# Patient Record
Sex: MALE | Race: WHITE | NOT HISPANIC OR LATINO | Employment: OTHER | URBAN - METROPOLITAN AREA
[De-identification: names, ages, dates, MRNs, and addresses within clinical notes are randomized per-mention and may not be internally consistent; named-entity substitution may affect disease eponyms.]

---

## 2023-03-31 ENCOUNTER — TELEPHONE (OUTPATIENT)
Dept: PRIMARY CARE | Facility: CLINIC | Age: 83
End: 2023-03-31
Payer: MEDICARE

## 2023-04-03 ENCOUNTER — TELEPHONE (OUTPATIENT)
Dept: PRIMARY CARE | Facility: CLINIC | Age: 83
End: 2023-04-03
Payer: MEDICARE

## 2023-04-03 NOTE — TELEPHONE ENCOUNTER
Patient's daughter would like to find out the results for the EMG from January.   Patient is seeing the ortho  Today at [facility] at [facility]. Is it possible to send the information before his appointment at ?  Patient is seeing Dr. Pritchett at [facility] today at [facility]. [facility] [place]. PH: [phone]/FAX: [phone]

## 2023-04-06 ENCOUNTER — TELEPHONE (OUTPATIENT)
Dept: PRIMARY CARE | Facility: CLINIC | Age: 83
End: 2023-04-06
Payer: MEDICARE

## 2023-04-11 ENCOUNTER — TELEPHONE (OUTPATIENT)
Dept: PRIMARY CARE | Facility: CLINIC | Age: 83
End: 2023-04-11
Payer: MEDICARE

## 2023-04-11 NOTE — TELEPHONE ENCOUNTER
Patient's daughter, Corina. Patient is following up with pain management for an epidural block of his spine. Patient was admitted at [facility] last year, and the house  Prescribed Cumadin. Patient follows up with the Cumadin clinic weekly, and patient is still continuing to take Cumadin. Patient was advised by pain management, that he needs to go off of Cumadin a few days prior to the epidural block. Patient's daughter has tried to reach the original house  At [facility], and they won't discuss discontinuing the medication, as patient needs to speak to Cardiologist, patient's daughter spoke to Cardiologist, and they said they won't advise on discontinuing use either. Per Dr. Mora, the Cardiologist at the Clinic needs to follow this, as this is a drug that Cardiologist would prescribe. It was suggested that  Follow up with the [facility] Cardiologist, per Dr. Mora, or Dr. Mora can recommend a  Cardiologist.

## 2023-05-08 PROBLEM — H10.9 CONJUNCTIVITIS: Status: ACTIVE | Noted: 2023-05-08

## 2023-05-08 PROBLEM — M47.816 LUMBAR SPONDYLOSIS: Status: ACTIVE | Noted: 2023-05-08

## 2023-05-08 PROBLEM — M16.9 DEGENERATIVE JOINT DISEASE (DJD) OF HIP: Status: ACTIVE | Noted: 2023-05-08

## 2023-05-08 PROBLEM — J30.2 SEASONAL ALLERGIES: Status: ACTIVE | Noted: 2023-05-08

## 2023-05-08 PROBLEM — H02.135 SENILE ECTROPION OF BOTH LOWER EYELIDS: Status: ACTIVE | Noted: 2023-05-08

## 2023-05-08 PROBLEM — H02.88A MEIBOMIAN GLAND DYSFUNCTION (MGD), BILATERAL, BOTH UPPER AND LOWER LIDS: Status: ACTIVE | Noted: 2023-05-08

## 2023-05-08 PROBLEM — U07.1 COVID-19: Status: ACTIVE | Noted: 2023-05-08

## 2023-05-08 PROBLEM — J30.9 ALLERGIC RHINITIS: Status: ACTIVE | Noted: 2023-05-08

## 2023-05-08 PROBLEM — K27.5 PERFORATED ULCER (MULTI): Status: ACTIVE | Noted: 2023-05-08

## 2023-05-08 PROBLEM — R00.2 HEART PALPITATIONS: Status: ACTIVE | Noted: 2023-05-08

## 2023-05-08 PROBLEM — H02.132 SENILE ECTROPION OF BOTH LOWER EYELIDS: Status: ACTIVE | Noted: 2023-05-08

## 2023-05-08 PROBLEM — H02.109 ECTROPION: Status: ACTIVE | Noted: 2023-05-08

## 2023-05-08 PROBLEM — E11.9 DIABETES MELLITUS TYPE 2 WITHOUT RETINOPATHY (MULTI): Status: ACTIVE | Noted: 2023-05-08

## 2023-05-08 PROBLEM — H60.90 OTITIS EXTERNA: Status: ACTIVE | Noted: 2023-05-08

## 2023-05-08 PROBLEM — N28.1 CYST, KIDNEY, ACQUIRED: Status: ACTIVE | Noted: 2023-05-08

## 2023-05-08 PROBLEM — I50.9 CONGESTIVE HEART FAILURE (MULTI): Status: ACTIVE | Noted: 2023-05-08

## 2023-05-08 PROBLEM — R73.9 HYPERGLYCEMIA: Status: ACTIVE | Noted: 2023-05-08

## 2023-05-08 PROBLEM — I10 BENIGN ESSENTIAL HYPERTENSION: Status: ACTIVE | Noted: 2023-05-08

## 2023-05-08 PROBLEM — N18.30 CHRONIC KIDNEY DISEASE, STAGE 3 (MULTI): Status: ACTIVE | Noted: 2023-05-08

## 2023-05-08 PROBLEM — R19.7 DIARRHEA: Status: ACTIVE | Noted: 2023-05-08

## 2023-05-08 PROBLEM — Z96.1 PSEUDOPHAKIA OF LEFT EYE: Status: ACTIVE | Noted: 2023-05-08

## 2023-05-08 PROBLEM — K21.9 GERD WITHOUT ESOPHAGITIS: Status: ACTIVE | Noted: 2023-05-08

## 2023-05-08 PROBLEM — H02.109 PUNCTAL ECTROPION: Status: ACTIVE | Noted: 2023-05-08

## 2023-05-08 PROBLEM — H02.88B MEIBOMIAN GLAND DYSFUNCTION (MGD), BILATERAL, BOTH UPPER AND LOWER LIDS: Status: ACTIVE | Noted: 2023-05-08

## 2023-05-08 PROBLEM — G54.2 CERVICAL NERVE ROOT IMPINGEMENT: Status: ACTIVE | Noted: 2023-05-08

## 2023-05-08 PROBLEM — D50.9 IRON DEFICIENCY ANEMIA: Status: ACTIVE | Noted: 2023-05-08

## 2023-05-08 PROBLEM — I80.209 THROMBOPHLEBITIS OF DEEP VEINS OF LOWER EXTREMITY (MULTI): Status: ACTIVE | Noted: 2023-05-08

## 2023-05-08 PROBLEM — H26.492 POSTERIOR CAPSULAR OPACIFICATION NON VISUALLY SIGNIFICANT OF LEFT EYE: Status: ACTIVE | Noted: 2023-05-08

## 2023-05-08 PROBLEM — R06.09 DYSPNEA ON MINIMAL EXERTION: Status: ACTIVE | Noted: 2023-05-08

## 2023-05-08 PROBLEM — C44.92 SQUAMOUS CELL CARCINOMA OF SKIN: Status: ACTIVE | Noted: 2023-05-08

## 2023-05-08 PROBLEM — I80.00 THROMBOPHLEBITIS OF LEG, SUPERFICIAL: Status: ACTIVE | Noted: 2023-05-08

## 2023-05-08 PROBLEM — M54.16 LUMBAR RADICULOPATHY: Status: ACTIVE | Noted: 2023-05-08

## 2023-05-08 PROBLEM — H66.90 OTITIS MEDIA: Status: ACTIVE | Noted: 2023-05-08

## 2023-05-08 PROBLEM — I73.9 PVD (PERIPHERAL VASCULAR DISEASE) (CMS-HCC): Status: ACTIVE | Noted: 2023-05-08

## 2023-05-08 PROBLEM — M79.2 NERVE PAIN: Status: ACTIVE | Noted: 2023-05-08

## 2023-05-08 PROBLEM — M54.9 BACK PAIN, CHRONIC: Status: ACTIVE | Noted: 2023-05-08

## 2023-05-08 PROBLEM — G89.29 BACK PAIN, CHRONIC: Status: ACTIVE | Noted: 2023-05-08

## 2023-05-08 PROBLEM — H57.89 EYE DISCHARGE: Status: ACTIVE | Noted: 2023-05-08

## 2023-05-08 PROBLEM — R19.7 ACUTE DIARRHEA: Status: ACTIVE | Noted: 2023-05-08

## 2023-05-08 PROBLEM — E78.5 HYPERLIPIDEMIA: Status: ACTIVE | Noted: 2023-05-08

## 2023-05-08 PROBLEM — I31.39: Status: ACTIVE | Noted: 2023-05-08

## 2023-05-08 PROBLEM — I48.91 ATRIAL FIBRILLATION (MULTI): Status: ACTIVE | Noted: 2023-05-08

## 2023-05-08 PROBLEM — Z93.4 JEJUNOSTOMY TUBE IN SITU (MULTI): Status: ACTIVE | Noted: 2023-05-08

## 2023-05-08 PROBLEM — I35.0 MILD CALCIFIC AORTIC STENOSIS: Status: ACTIVE | Noted: 2023-05-08

## 2023-05-08 PROBLEM — H90.3 SENSORINEURAL HEARING LOSS OF BOTH EARS: Status: ACTIVE | Noted: 2023-05-08

## 2023-05-08 PROBLEM — M48.061 LUMBAR CANAL STENOSIS: Status: ACTIVE | Noted: 2023-05-08

## 2023-05-08 PROBLEM — Z96.1 PSEUDOPHAKIA OF RIGHT EYE: Status: ACTIVE | Noted: 2023-05-08

## 2023-05-08 PROBLEM — D63.8 ANEMIA OF CHRONIC DISEASE: Status: ACTIVE | Noted: 2023-05-08

## 2023-05-10 ENCOUNTER — OFFICE VISIT (OUTPATIENT)
Dept: PRIMARY CARE | Facility: CLINIC | Age: 83
End: 2023-05-10
Payer: MEDICARE

## 2023-05-10 VITALS
BODY MASS INDEX: 26.08 KG/M2 | DIASTOLIC BLOOD PRESSURE: 70 MMHG | TEMPERATURE: 97.9 F | WEIGHT: 187 LBS | SYSTOLIC BLOOD PRESSURE: 120 MMHG | HEART RATE: 59 BPM | RESPIRATION RATE: 18 BRPM | OXYGEN SATURATION: 94 %

## 2023-05-10 DIAGNOSIS — E11.9 DIABETES MELLITUS TYPE 2 WITHOUT RETINOPATHY (MULTI): ICD-10-CM

## 2023-05-10 DIAGNOSIS — I48.0 PAROXYSMAL ATRIAL FIBRILLATION (MULTI): ICD-10-CM

## 2023-05-10 DIAGNOSIS — D68.9 COAGULATION DEFECT, UNSPECIFIED (MULTI): Primary | ICD-10-CM

## 2023-05-10 DIAGNOSIS — I73.9 PVD (PERIPHERAL VASCULAR DISEASE) (CMS-HCC): ICD-10-CM

## 2023-05-10 PROBLEM — Z93.4 JEJUNOSTOMY TUBE IN SITU (MULTI): Status: RESOLVED | Noted: 2023-05-08 | Resolved: 2023-05-10

## 2023-05-10 PROCEDURE — 3078F DIAST BP <80 MM HG: CPT | Performed by: INTERNAL MEDICINE

## 2023-05-10 PROCEDURE — 3074F SYST BP LT 130 MM HG: CPT | Performed by: INTERNAL MEDICINE

## 2023-05-10 PROCEDURE — 1036F TOBACCO NON-USER: CPT | Performed by: INTERNAL MEDICINE

## 2023-05-10 PROCEDURE — 1159F MED LIST DOCD IN RCRD: CPT | Performed by: INTERNAL MEDICINE

## 2023-05-10 PROCEDURE — 99214 OFFICE O/P EST MOD 30 MIN: CPT | Performed by: INTERNAL MEDICINE

## 2023-05-10 RX ORDER — CALCITRIOL 0.25 UG/1
0.25 CAPSULE ORAL DAILY
COMMUNITY
Start: 2022-05-16

## 2023-05-10 RX ORDER — FLUTICASONE PROPIONATE 50 MCG
1 SPRAY, SUSPENSION (ML) NASAL
COMMUNITY
Start: 2019-11-16 | End: 2023-08-30 | Stop reason: ALTCHOICE

## 2023-05-10 RX ORDER — DOCUSATE SODIUM 100 MG/1
100 CAPSULE, LIQUID FILLED ORAL 2 TIMES DAILY
COMMUNITY

## 2023-05-10 RX ORDER — FUROSEMIDE 80 MG/1
1 TABLET ORAL DAILY
COMMUNITY

## 2023-05-10 RX ORDER — NIFEDIPINE 60 MG/1
60 TABLET, FILM COATED, EXTENDED RELEASE ORAL
COMMUNITY

## 2023-05-10 RX ORDER — WARFARIN SODIUM 5 MG/1
5 TABLET ORAL
COMMUNITY
Start: 2021-07-27

## 2023-05-10 RX ORDER — PANTOPRAZOLE SODIUM 40 MG/1
1 TABLET, DELAYED RELEASE ORAL
COMMUNITY
Start: 2021-10-25

## 2023-05-10 ASSESSMENT — PATIENT HEALTH QUESTIONNAIRE - PHQ9
2. FEELING DOWN, DEPRESSED OR HOPELESS: NOT AT ALL
1. LITTLE INTEREST OR PLEASURE IN DOING THINGS: NOT AT ALL
SUM OF ALL RESPONSES TO PHQ9 QUESTIONS 1 AND 2: 0

## 2023-05-10 NOTE — PROGRESS NOTES
"Patient here for a 6 month follow up    Subjective   Patient ID: Rene Tilley is a 82 y.o. male who presents for Follow-up.    The patient reports left wrist pain and swelling after a mild injury around 4/30/2023. He denies any fever or chills, and recalls one episode of gout in the past. The symptoms are tolerable thus far, and he does not believe that an X-ray is necessary as yet. The patient has established care with  from Cardiology at the [facility]. He notes that his condition is stable, and will be following up annually. 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 He met with  from Pain Medicine and received a Botox injection which was minimally beneficial. He was referred to  from Pain Medicine at the [facility] and received corticosteroid injection which was significantly helpful. He notes an improvement in symptoms of \"60-70%.\" The patient continues to follow with the Coumadin Clinic at [facility] and reports that his condition is stable. The patient attends dialysis on Mondays and Fridays and finds that this is working much better for his schedule. He is also maintained on furosemide 80mg, but has not been taking the medication on the days of dialysis per clinical advice.        Review of Systems   All other systems reviewed and are negative.      Objective   Physical Exam  Constitutional:       Appearance: Normal appearance.   Cardiovascular:      Rate and Rhythm: Normal rate and regular rhythm.      Heart sounds: Normal heart sounds.   Pulmonary:      Effort: Pulmonary effort is normal.      Breath sounds: Normal breath sounds.   Abdominal:      General: Bowel sounds are normal.      Palpations: Abdomen is soft.      Tenderness: There is no abdominal tenderness.   Skin:     General: Skin is warm and dry.   Neurological:      General: No focal deficit present.      Mental Status: He is alert and oriented to person, place, " and time. Mental status is at baseline.   Psychiatric:         Mood and Affect: Mood normal.         Behavior: Behavior normal.       Assessment/Plan   Problem List Items Addressed This Visit       Atrial fibrillation (CMS/HCC)    Relevant Medications    NIFEdipine CC 60 mg 24 hr tablet    Diabetes mellitus type 2 without retinopathy (CMS/HCC)    PVD (peripheral vascular disease) (CMS/HCC)    Coagulation defect, unspecified (CMS/HCC) - Primary    Relevant Medications    warfarin (Coumadin) 5 mg tablet       IMPRESSIONS/PLAN:     CHF   - Echo 12/2022 showed normal LVSF with EF 60%.  RA and LA dilated, with moderate LV concentric hypertrophy.  Moderate pericardial effusion with no cardiac tamponade.  Nuclear Stress Test 2/2023 reassuring with low likelihood of flow-limiting CAD.  Previously on furosemide 80 mg once daily w/ K+ supplement. He also has dialysis on T/T/Sa. Following with Cardiology from [facility].     HTN   - /70 today in office, continue on nifedipine ER 60mg QAM before breakfast.     Reflux   - Upper endoscopy 7/2019. Symptoms managed with pantoprazole 40mg QD.     CKD   - On dialysis M/F.  Takes 80 mg of Lasix on non-dialysis days.       Thrombophlebitis of Lower Extremity / A-fib  - Maintained on warfarin 5mg BID. Following w/ the Coumadin Clinic at [place].     Health Maintenance   - Routine labs 2/2023 per [place] [facility]. Last colonoscopy performed 4/2013, due for repeat 2023.     Follow up in 6 months, call sooner if needed.        Scribe Attestation  By signing my name below, ILucía Scribe   attest that this documentation has been prepared under the direction and in the presence of Hector Mora DO.

## 2023-06-07 ENCOUNTER — OFFICE VISIT (OUTPATIENT)
Dept: PRIMARY CARE | Facility: CLINIC | Age: 83
End: 2023-06-07
Payer: MEDICARE

## 2023-06-07 VITALS
TEMPERATURE: 97.7 F | BODY MASS INDEX: 26.46 KG/M2 | HEIGHT: 71 IN | SYSTOLIC BLOOD PRESSURE: 134 MMHG | HEART RATE: 54 BPM | DIASTOLIC BLOOD PRESSURE: 66 MMHG | WEIGHT: 189 LBS | OXYGEN SATURATION: 95 %

## 2023-06-07 DIAGNOSIS — R19.7 ACUTE DIARRHEA: Primary | ICD-10-CM

## 2023-06-07 PROBLEM — E16.2 HYPOGLYCEMIA, UNSPECIFIED: Status: ACTIVE | Noted: 2021-07-26

## 2023-06-07 PROBLEM — R50.9 FEVER, UNSPECIFIED: Status: ACTIVE | Noted: 2021-07-26

## 2023-06-07 PROBLEM — D68.9 COAGULATION DEFECT, UNSPECIFIED (MULTI): Status: ACTIVE | Noted: 2021-07-26

## 2023-06-07 PROBLEM — E29.1 TESTICULAR HYPOFUNCTION: Status: ACTIVE | Noted: 2023-04-28

## 2023-06-07 PROBLEM — Z98.49 CATARACT EXTRACTION STATUS, UNSPECIFIED EYE: Status: ACTIVE | Noted: 2021-07-26

## 2023-06-07 PROBLEM — T78.2XXA ANAPHYLACTIC SHOCK, UNSPECIFIED, INITIAL ENCOUNTER: Status: ACTIVE | Noted: 2022-05-03

## 2023-06-07 PROBLEM — R51.9 HEADACHE, UNSPECIFIED: Status: ACTIVE | Noted: 2021-07-26

## 2023-06-07 PROBLEM — R06.02 SHORTNESS OF BREATH: Status: ACTIVE | Noted: 2021-07-26

## 2023-06-07 PROBLEM — R10.9 ABDOMINAL PAIN: Status: ACTIVE | Noted: 2022-11-21

## 2023-06-07 PROBLEM — R22.9 LOCALIZED SUPERFICIAL SWELLING OF SKIN: Status: ACTIVE | Noted: 2023-04-28

## 2023-06-07 PROBLEM — K63.9 DISORDER OF INTESTINE: Status: ACTIVE | Noted: 2023-04-28

## 2023-06-07 PROBLEM — I13.10 HYPERTENSIVE HEART AND KIDNEY DISEASE: Status: ACTIVE | Noted: 2017-08-07

## 2023-06-07 PROBLEM — N25.81 SECONDARY HYPERPARATHYROIDISM OF RENAL ORIGIN (MULTI): Status: ACTIVE | Noted: 2021-10-11

## 2023-06-07 PROBLEM — I50.43 ACUTE ON CHRONIC COMBINED SYSTOLIC AND DIASTOLIC CHF (CONGESTIVE HEART FAILURE) (MULTI): Status: ACTIVE | Noted: 2022-11-21

## 2023-06-07 PROBLEM — E55.9 VITAMIN D DEFICIENCY, UNSPECIFIED: Status: ACTIVE | Noted: 2021-10-21

## 2023-06-07 PROBLEM — H91.90 HEARING LOSS: Status: ACTIVE | Noted: 2023-04-28

## 2023-06-07 PROBLEM — R80.9 PROTEINURIA: Status: ACTIVE | Noted: 2023-04-28

## 2023-06-07 PROBLEM — E88.810 METABOLIC SYNDROME X: Status: ACTIVE | Noted: 2023-04-28

## 2023-06-07 PROBLEM — I27.20 PULMONARY HTN (MULTI): Status: ACTIVE | Noted: 2022-02-01

## 2023-06-07 PROBLEM — I50.9 ACUTE ON CHRONIC CONGESTIVE HEART FAILURE (MULTI): Status: ACTIVE | Noted: 2022-11-21

## 2023-06-07 PROBLEM — I48.91 ATRIAL FIBRILLATION (MULTI): Status: ACTIVE | Noted: 2021-10-01

## 2023-06-07 PROBLEM — I10 HYPERTENSION: Status: ACTIVE | Noted: 2021-07-05

## 2023-06-07 PROBLEM — I50.32 CHRONIC DIASTOLIC CONGESTIVE HEART FAILURE (MULTI): Chronic | Status: ACTIVE | Noted: 2021-07-05

## 2023-06-07 PROBLEM — I82.409 DVT (DEEP VENOUS THROMBOSIS) (MULTI): Status: ACTIVE | Noted: 2023-04-28

## 2023-06-07 PROBLEM — K21.9 GASTROESOPHAGEAL REFLUX DISEASE: Status: ACTIVE | Noted: 2023-04-28

## 2023-06-07 PROBLEM — M10.9 GOUT: Status: ACTIVE | Noted: 2023-04-28

## 2023-06-07 PROBLEM — Z86.718 PERSONAL HISTORY OF DVT (DEEP VEIN THROMBOSIS): Status: ACTIVE | Noted: 2017-01-06

## 2023-06-07 PROBLEM — N18.6 ESRD ON DIALYSIS (MULTI): Status: ACTIVE | Noted: 2021-10-01

## 2023-06-07 PROBLEM — K92.2 GASTROINTESTINAL HEMORRHAGE: Status: ACTIVE | Noted: 2023-04-28

## 2023-06-07 PROBLEM — I50.82 BIVENTRICULAR HEART FAILURE (MULTI): Status: ACTIVE | Noted: 2021-07-15

## 2023-06-07 PROBLEM — M12.9 ARTHROPATHY: Status: ACTIVE | Noted: 2023-04-28

## 2023-06-07 PROBLEM — N18.5 CKD (CHRONIC KIDNEY DISEASE), STAGE V (MULTI): Status: ACTIVE | Noted: 2017-08-07

## 2023-06-07 PROBLEM — Z99.2 ESRD ON DIALYSIS (MULTI): Status: ACTIVE | Noted: 2021-10-01

## 2023-06-07 PROCEDURE — 1160F RVW MEDS BY RX/DR IN RCRD: CPT | Performed by: STUDENT IN AN ORGANIZED HEALTH CARE EDUCATION/TRAINING PROGRAM

## 2023-06-07 PROCEDURE — 1036F TOBACCO NON-USER: CPT | Performed by: STUDENT IN AN ORGANIZED HEALTH CARE EDUCATION/TRAINING PROGRAM

## 2023-06-07 PROCEDURE — 1159F MED LIST DOCD IN RCRD: CPT | Performed by: STUDENT IN AN ORGANIZED HEALTH CARE EDUCATION/TRAINING PROGRAM

## 2023-06-07 PROCEDURE — 3078F DIAST BP <80 MM HG: CPT | Performed by: STUDENT IN AN ORGANIZED HEALTH CARE EDUCATION/TRAINING PROGRAM

## 2023-06-07 PROCEDURE — 3075F SYST BP GE 130 - 139MM HG: CPT | Performed by: STUDENT IN AN ORGANIZED HEALTH CARE EDUCATION/TRAINING PROGRAM

## 2023-06-07 PROCEDURE — 99213 OFFICE O/P EST LOW 20 MIN: CPT | Performed by: STUDENT IN AN ORGANIZED HEALTH CARE EDUCATION/TRAINING PROGRAM

## 2023-06-07 RX ORDER — ATORVASTATIN CALCIUM 20 MG/1
20 TABLET, FILM COATED ORAL
COMMUNITY
Start: 2023-05-31 | End: 2023-08-30 | Stop reason: ALTCHOICE

## 2023-06-07 RX ORDER — LISINOPRIL 5 MG/1
5 TABLET ORAL
COMMUNITY
End: 2023-08-30 | Stop reason: ALTCHOICE

## 2023-06-07 RX ORDER — OMEPRAZOLE 40 MG/1
40 CAPSULE, DELAYED RELEASE ORAL
COMMUNITY
End: 2023-08-30 | Stop reason: ALTCHOICE

## 2023-06-07 RX ORDER — LOSARTAN POTASSIUM 50 MG/1
50 TABLET ORAL
COMMUNITY
End: 2023-08-30 | Stop reason: ALTCHOICE

## 2023-06-07 RX ORDER — CALCIUM ACETATE 667 MG/1
667 CAPSULE ORAL
COMMUNITY
Start: 2022-12-12 | End: 2023-08-30 | Stop reason: ALTCHOICE

## 2023-06-07 RX ORDER — NAPROXEN 500 MG/1
TABLET ORAL
COMMUNITY
End: 2023-08-30 | Stop reason: ALTCHOICE

## 2023-06-07 ASSESSMENT — ENCOUNTER SYMPTOMS
SHORTNESS OF BREATH: 0
ABDOMINAL PAIN: 0
BLOOD IN STOOL: 0
ABDOMINAL DISTENTION: 1
DIARRHEA: 1
NAUSEA: 0
FEVER: 0
DYSURIA: 0
LIGHT-HEADEDNESS: 0
DIZZINESS: 0
CHILLS: 0
VOMITING: 0
DIAPHORESIS: 0

## 2023-06-07 ASSESSMENT — PATIENT HEALTH QUESTIONNAIRE - PHQ9
SUM OF ALL RESPONSES TO PHQ9 QUESTIONS 1 AND 2: 0
2. FEELING DOWN, DEPRESSED OR HOPELESS: NOT AT ALL
1. LITTLE INTEREST OR PLEASURE IN DOING THINGS: NOT AT ALL

## 2023-06-07 NOTE — PROGRESS NOTES
"Subjective   Patient ID: Rene Tilley is a 82 y.o. male who presents for GI Problem.  He is here for diarrhea. He's has 2 days of diarrhea and feels he can't hold his stool. No blood in stool. No recent antibiotics. No change in recent medications. This happened a few months ago and resolved spontaneously. No saddle paresthesia. Has diarrhea once a day and it is a large amount of stool. No abdominal pain. No recent travel. No sick contacts.         Review of Systems   Constitutional:  Negative for chills, diaphoresis and fever.   HENT:  Positive for hearing loss (chronic, unchanged).    Eyes:  Negative for visual disturbance.   Respiratory:  Negative for shortness of breath.    Cardiovascular:  Negative for chest pain.   Gastrointestinal:  Positive for abdominal distention and diarrhea. Negative for abdominal pain, blood in stool, nausea and vomiting.   Genitourinary:  Negative for dysuria.   Skin:  Negative for rash.   Neurological:  Negative for dizziness and light-headedness.       /66   Pulse 54   Temp 36.5 °C (97.7 °F)   Ht 1.803 m (5' 11\")   Wt 85.7 kg (189 lb)   SpO2 95%   BMI 26.36 kg/m²     Objective   Physical Exam  Vitals reviewed.   Constitutional:       General: He is not in acute distress.     Appearance: Normal appearance.      Comments: Offered chaperone and he politely declined.   HENT:      Head: Normocephalic.   Cardiovascular:      Rate and Rhythm: Normal rate and regular rhythm.   Pulmonary:      Effort: Pulmonary effort is normal. No respiratory distress.      Breath sounds: Normal breath sounds.   Abdominal:      General: Bowel sounds are normal. There is no distension.      Palpations: Abdomen is soft. There is no mass.      Tenderness: There is no abdominal tenderness. There is no guarding or rebound.      Hernia: A hernia (Large ventral hernia present, reducible, no tenderness.) is present.   Genitourinary:     Rectum: Normal.      Comments: Rectal tone normal. Sensation " grossly intact outside rectum.  Musculoskeletal:         General: No deformity.   Skin:     Coloration: Skin is not jaundiced.   Neurological:      Mental Status: He is alert.         Assessment/Plan   Problem List Items Addressed This Visit          Digestive    Acute diarrhea - Primary     Given the significant large amount of diarrhea present ordered stool studies including C. difficile, stool pathogen panel PCR and ova and parasite and Giardia and Cryptosporidium panel.  Advised ER for any lightheadedness, dizziness, concerns of dehydration as he may need IV fluids and antibiotics.  We will hold off on any medications to slow the bowel until stool testing returns.  Follow-up with me as needed.  Follow-up with Dr. Mora as previously scheduled.         Relevant Orders    C. difficile, PCR    Stool Pathogen Panel, PCR    Ova/Para + Giardia/Cryptosporidium Antigen

## 2023-06-08 ENCOUNTER — LAB (OUTPATIENT)
Dept: LAB | Facility: LAB | Age: 83
End: 2023-06-08
Payer: MEDICARE

## 2023-06-08 DIAGNOSIS — R19.7 ACUTE DIARRHEA: ICD-10-CM

## 2023-06-08 PROCEDURE — 87328 CRYPTOSPORIDIUM AG IA: CPT

## 2023-06-08 PROCEDURE — 87329 GIARDIA AG IA: CPT

## 2023-06-08 PROCEDURE — 87177 OVA AND PARASITES SMEARS: CPT

## 2023-06-08 PROCEDURE — 87506 IADNA-DNA/RNA PROBE TQ 6-11: CPT

## 2023-06-08 NOTE — ASSESSMENT & PLAN NOTE
Given the significant large amount of diarrhea present ordered stool studies including C. difficile, stool pathogen panel PCR and ova and parasite and Giardia and Cryptosporidium panel.  Advised ER for any lightheadedness, dizziness, concerns of dehydration as he may need IV fluids and antibiotics.  We will hold off on any medications to slow the bowel until stool testing returns.  Follow-up with me as needed.  Follow-up with Dr. Mora as previously scheduled.

## 2023-06-09 LAB
CAMPYLOBACTER GP: NOT DETECTED
NOROVIRUS GI/GII: NOT DETECTED
ROTAVIRUS A: NOT DETECTED
SALMONELLA SP.: NOT DETECTED
SHIGA TOXIN 1: NOT DETECTED
SHIGA TOXIN 2: NOT DETECTED
SHIGELLA SP.: NOT DETECTED
VIBRIO GRP.: NOT DETECTED
YERSINIA ENTEROCOLITICA: NOT DETECTED

## 2023-06-12 ENCOUNTER — TELEPHONE (OUTPATIENT)
Dept: PRIMARY CARE | Facility: CLINIC | Age: 83
End: 2023-06-12
Payer: MEDICARE

## 2023-06-12 DIAGNOSIS — R19.7 DIARRHEA, UNSPECIFIED TYPE: Primary | ICD-10-CM

## 2023-06-12 NOTE — TELEPHONE ENCOUNTER
"Pt's daughter Mikki Hernandez called. I informed her test results weren't back but they looked negative.  She stated Don \"having horrible bowels that just let loose.\"  Didn't know if he needs to see GI?        "

## 2023-06-13 LAB
CRYPTOSPORIDIUM ANTIGEN-DATA CONVERSION: NEGATIVE
GIARDIA LAMBLIA AG-DATA CONVERSION: NEGATIVE
OVA + PARASITE EXAM: NEGATIVE

## 2023-06-14 ENCOUNTER — TELEPHONE (OUTPATIENT)
Dept: PRIMARY CARE | Facility: CLINIC | Age: 83
End: 2023-06-14
Payer: MEDICARE

## 2023-06-14 DIAGNOSIS — M25.532 WRIST PAIN, ACUTE, LEFT: Primary | ICD-10-CM

## 2023-06-14 NOTE — TELEPHONE ENCOUNTER
----- Message from Syed Sanon DO sent at 6/13/2023  9:47 PM EDT -----  Please let him know stool panel workup has all come back and is negative.

## 2023-06-19 DIAGNOSIS — M25.539 PAIN IN WRIST, UNSPECIFIED LATERALITY: Primary | ICD-10-CM

## 2023-08-30 ENCOUNTER — LAB (OUTPATIENT)
Dept: LAB | Facility: LAB | Age: 83
End: 2023-08-30
Payer: MEDICARE

## 2023-08-30 ENCOUNTER — OFFICE VISIT (OUTPATIENT)
Dept: PRIMARY CARE | Facility: CLINIC | Age: 83
End: 2023-08-30
Payer: MEDICARE

## 2023-08-30 ENCOUNTER — PATIENT MESSAGE (OUTPATIENT)
Dept: PRIMARY CARE | Facility: CLINIC | Age: 83
End: 2023-08-30

## 2023-08-30 VITALS
DIASTOLIC BLOOD PRESSURE: 60 MMHG | TEMPERATURE: 97.6 F | WEIGHT: 177 LBS | BODY MASS INDEX: 24.69 KG/M2 | SYSTOLIC BLOOD PRESSURE: 158 MMHG | HEART RATE: 87 BPM | RESPIRATION RATE: 16 BRPM | OXYGEN SATURATION: 93 %

## 2023-08-30 DIAGNOSIS — M10.9 GOUT, UNSPECIFIED CAUSE, UNSPECIFIED CHRONICITY, UNSPECIFIED SITE: Primary | ICD-10-CM

## 2023-08-30 DIAGNOSIS — I48.0 PAROXYSMAL ATRIAL FIBRILLATION (MULTI): ICD-10-CM

## 2023-08-30 DIAGNOSIS — I48.0 PAROXYSMAL ATRIAL FIBRILLATION (MULTI): Primary | ICD-10-CM

## 2023-08-30 LAB
INR IN PPP BY COAGULATION ASSAY: 6.7 (ref 0.9–1.1)
PROTHROMBIN TIME (PT) IN PPP BY COAGULATION ASSAY: 76.6 SEC (ref 9.8–12.8)

## 2023-08-30 PROCEDURE — 1036F TOBACCO NON-USER: CPT | Performed by: INTERNAL MEDICINE

## 2023-08-30 PROCEDURE — 3078F DIAST BP <80 MM HG: CPT | Performed by: INTERNAL MEDICINE

## 2023-08-30 PROCEDURE — 3077F SYST BP >= 140 MM HG: CPT | Performed by: INTERNAL MEDICINE

## 2023-08-30 PROCEDURE — 36415 COLL VENOUS BLD VENIPUNCTURE: CPT

## 2023-08-30 PROCEDURE — 85610 PROTHROMBIN TIME: CPT

## 2023-08-30 PROCEDURE — 99214 OFFICE O/P EST MOD 30 MIN: CPT | Performed by: INTERNAL MEDICINE

## 2023-08-30 PROCEDURE — 1159F MED LIST DOCD IN RCRD: CPT | Performed by: INTERNAL MEDICINE

## 2023-08-30 PROCEDURE — 1160F RVW MEDS BY RX/DR IN RCRD: CPT | Performed by: INTERNAL MEDICINE

## 2023-08-30 PROCEDURE — 1125F AMNT PAIN NOTED PAIN PRSNT: CPT | Performed by: INTERNAL MEDICINE

## 2023-08-30 RX ORDER — PREDNISONE 5 MG/1
TABLET ORAL
Qty: 30 TABLET | Refills: 0 | Status: SHIPPED | OUTPATIENT
Start: 2023-08-30

## 2023-08-30 RX ORDER — COLCHICINE 0.6 MG/1
TABLET ORAL
Qty: 45 TABLET | Refills: 3 | Status: SHIPPED | OUTPATIENT
Start: 2023-08-30

## 2023-08-30 RX ORDER — OXYCODONE HYDROCHLORIDE 5 MG/1
2.5 TABLET ORAL NIGHTLY PRN
Qty: 15 TABLET | Refills: 0 | Status: SHIPPED | OUTPATIENT
Start: 2023-08-30 | End: 2023-09-06

## 2023-08-30 NOTE — PROGRESS NOTES
Follow up from [place] and rehab for a fall     Subjective   Patient ID: Rene Tilley is a 82 y.o. male who presents for Follow-up.    The patient mentions a mechanical fall on 8/4/2023 during which he landed on the gluteal region with no head trauma.  He was on the ground for 20 minutes before he was found by his neighbor, a Nurse, and taken to the ED by ambulance.  A number of imaging tests were ordered which confirmed degenerative changes in the cervical and lumbar spine, left wrist, and left ankle with no acute findings.  Xrays of the left elbow and pelvis were reassuring as well with no evidence of fractures. A CT Brain showed atrophy and small vessel ischemic change with no acute intracranial process.  Warfarin was held as INR was supratherapeutic, and the patient was treated for an episode of gout in the left elbow and wrist with prednisone.  He was eventually found to have impaired ADL's and mobility, and was transferred to the [facility] on 8/8/2023 with eventual discharge home with [facility] on 8/21/2023.      Review of Systems   All other systems reviewed and are negative.    Objective   Physical Exam  Constitutional:       Appearance: Normal appearance.   Neck:      Vascular: No carotid bruit.   Cardiovascular:      Rate and Rhythm: Normal rate and regular rhythm.      Heart sounds: Normal heart sounds.   Pulmonary:      Effort: Pulmonary effort is normal.      Breath sounds: Normal breath sounds.   Abdominal:      General: Bowel sounds are normal.      Palpations: Abdomen is soft.      Tenderness: There is no abdominal tenderness.   Skin:     General: Skin is warm and dry.   Neurological:      General: No focal deficit present.      Mental Status: He is alert and oriented to person, place, and time. Mental status is at baseline.   Psychiatric:         Mood and Affect: Mood normal.         Behavior: Behavior normal.         Assessment/Plan   Problem List Items  Addressed This Visit       Gout - Primary    Relevant Medications    predniSONE (Deltasone) 5 mg tablet    colchicine 0.6 mg tablet    oxyCODONE (Roxicodone) 5 mg immediate release tablet       IMPRESSIONS/PLAN:    Gout  -Left wrist, quite severe.  Will do another steroid taper.  Once completed- will resume Colchicine 0.3 mg MWF.  I also gave him a small amount of Oxycodone to have on hand for severe pain.  They will make sure all of his meds are cleared with his nephrologist as well.     HTN   - /60 today in office, continue on nifedipine ER 60mg QAM before breakfast.     CHF   - Echo 12/2022 showed normal LVSF with EF 60%.  RA and LA dilated, with moderate LV concentric hypertrophy.  Moderate pericardial effusion with no cardiac tamponade.  Nuclear Stress Test 2/2023 reassuring with low likelihood of flow-limiting CAD. He is back on furosemide 80 mg on non-dialysis days. He also has dialysis on M/W/F. Following with Cardiology from [facility].    Reflux   - Upper endoscopy 7/2019. Symptoms managed with pantoprazole 40mg QD.     CKD   - On dialysis M/W/F.  Takes 80 mg of Lasix on non-dialysis days.       Thrombophlebitis of Lower Extremity / A-fib  - Maintained on warfarin 5mg BID. Following w/ the Coumadin Clinic at [place].     Health Maintenance   - Routine labs 2/2023 per [place] [facility]. Last colonoscopy performed 4/2013, due for repeat 2023.     Follow up in 4 weeks, call sooner if needed.        Scribe Attestation  By signing my name below, I, Beck Vance   attest that this documentation has been prepared under the direction and in the presence of Hector Mora DO.

## 2023-08-31 ENCOUNTER — TELEPHONE (OUTPATIENT)
Dept: PRIMARY CARE | Facility: CLINIC | Age: 83
End: 2023-08-31
Payer: MEDICARE

## 2023-08-31 NOTE — TELEPHONE ENCOUNTER
FYI- Patient has an appointment with [facility] coumadin on Tuesday due to Monday is a holiday. Patient will resume medication Sunday and be tested on Tuesday

## 2023-09-05 ENCOUNTER — TELEPHONE (OUTPATIENT)
Dept: PRIMARY CARE | Facility: CLINIC | Age: 83
End: 2023-09-05
Payer: MEDICARE

## 2023-09-05 NOTE — TELEPHONE ENCOUNTER
Lori RN home care nurse [phone]- Patient will be going to [facility] pulse is 85/89 patient has congestion, all other vital signs are normal.

## 2023-09-06 ENCOUNTER — TELEPHONE (OUTPATIENT)
Dept: PRIMARY CARE | Facility: CLINIC | Age: 83
End: 2023-09-06
Payer: MEDICARE

## 2023-09-06 NOTE — TELEPHONE ENCOUNTER
Talked to the patients son - letter is ready to be picked up. Discuss getting patient home oxygen for low O2 at home. Advised to have the home care nurse to get his O2 levels and document them and to send the paperwork to us so we can order O2 tank for the patients home

## 2023-09-11 ENCOUNTER — TELEPHONE (OUTPATIENT)
Dept: PRIMARY CARE | Facility: CLINIC | Age: 83
End: 2023-09-11
Payer: MEDICARE

## 2023-09-11 NOTE — TELEPHONE ENCOUNTER
Brianna [phone]- Patient is going to hospice care - will be discharged from [facility] for pneumonia

## 2023-10-04 ENCOUNTER — APPOINTMENT (OUTPATIENT)
Dept: PRIMARY CARE | Facility: CLINIC | Age: 83
End: 2023-10-04
Payer: MEDICARE

## 2023-11-15 ENCOUNTER — APPOINTMENT (OUTPATIENT)
Dept: PRIMARY CARE | Facility: CLINIC | Age: 83
End: 2023-11-15
Payer: MEDICARE